# Patient Record
Sex: MALE | Race: WHITE
[De-identification: names, ages, dates, MRNs, and addresses within clinical notes are randomized per-mention and may not be internally consistent; named-entity substitution may affect disease eponyms.]

---

## 2014-10-08 VITALS
SYSTOLIC BLOOD PRESSURE: 130 MMHG | SYSTOLIC BLOOD PRESSURE: 130 MMHG | DIASTOLIC BLOOD PRESSURE: 78 MMHG | DIASTOLIC BLOOD PRESSURE: 78 MMHG | SYSTOLIC BLOOD PRESSURE: 130 MMHG | SYSTOLIC BLOOD PRESSURE: 130 MMHG | DIASTOLIC BLOOD PRESSURE: 78 MMHG | SYSTOLIC BLOOD PRESSURE: 130 MMHG | SYSTOLIC BLOOD PRESSURE: 130 MMHG | DIASTOLIC BLOOD PRESSURE: 78 MMHG | SYSTOLIC BLOOD PRESSURE: 130 MMHG | SYSTOLIC BLOOD PRESSURE: 130 MMHG | SYSTOLIC BLOOD PRESSURE: 130 MMHG | DIASTOLIC BLOOD PRESSURE: 78 MMHG | DIASTOLIC BLOOD PRESSURE: 78 MMHG | DIASTOLIC BLOOD PRESSURE: 78 MMHG | DIASTOLIC BLOOD PRESSURE: 78 MMHG | DIASTOLIC BLOOD PRESSURE: 78 MMHG

## 2021-05-12 ENCOUNTER — HOSPITAL ENCOUNTER (OUTPATIENT)
Dept: HOSPITAL 61 - PNCL | Age: 56
Discharge: HOME | End: 2021-05-12
Attending: ANESTHESIOLOGY
Payer: MEDICARE

## 2021-05-12 DIAGNOSIS — K21.9: ICD-10-CM

## 2021-05-12 DIAGNOSIS — M54.2: ICD-10-CM

## 2021-05-12 DIAGNOSIS — Z79.82: ICD-10-CM

## 2021-05-12 DIAGNOSIS — E11.9: ICD-10-CM

## 2021-05-12 DIAGNOSIS — M54.16: Primary | ICD-10-CM

## 2021-05-12 DIAGNOSIS — E78.00: ICD-10-CM

## 2021-05-12 DIAGNOSIS — I10: ICD-10-CM

## 2021-05-12 DIAGNOSIS — Z79.84: ICD-10-CM

## 2021-05-12 DIAGNOSIS — M96.1: ICD-10-CM

## 2021-05-12 DIAGNOSIS — Z98.890: ICD-10-CM

## 2021-05-12 DIAGNOSIS — Z87.891: ICD-10-CM

## 2021-05-12 DIAGNOSIS — M54.12: ICD-10-CM

## 2021-05-12 DIAGNOSIS — Z79.899: ICD-10-CM

## 2021-05-12 PROCEDURE — G0463 HOSPITAL OUTPT CLINIC VISIT: HCPCS

## 2021-05-12 NOTE — PDOC
Progress Note - Pain Clinic


Date of Service:


DOS:


DATE: 5/12/21 


TIME: 10:51





Diagnosis:


Dx:


Lumbar radiculopathy lumbar postlaminectomy syndrome


Cervical radiculopathy with cervicalgia


Fascial pain





History or Present Illness:


HPI:


55-year-old male returns for follow-up status post medication management with 

methadone and recent caudal epidural steroid injection.  Patient reports his leg

is doing much better after the caudal injection on the left side and his back is

still significantly painful ostial complaint is base the neck and upper 

extremity pain at this time.  Patient reports his pain is a 7 on scale 10 is 

worse with the past week for an average to its least is a 4 today patient 

reports no new motor or sensory deficits no side effects with his medication 

reports medication alone is about 70% improvement without any side effects as 

noted.  Patient reports he can increase his distance walking to greater activity

sleeping better as well with still wakes him about every 2-3 hours from the pain

in the low back and the neck.  Patient reports his leg is doing much better 

however the patient has no new motor or sensory deficits no new bowel or bladder

incontinence or other complaints.





Physical Exam:


VS:


Blood pressure is 147/73 pulse 65 respirations 18 temperature 98.6 F height is 

5 foot 9 inches weight is 209 pounds


PE:


PHYSICAL EXAMINATION:





GENERAL: The patient is awake, alert, oriented, appropriate, very pleasant 

demeanor


HEENT: Shows normocephalic, atraumatic.  Extraocular movements are intact and 

symmetrical.  Oral cavity: Mucous membranes moist and pink.  Dentition is 

intact.


NECK: Shows anterior throat supple without palpable lymphadenopathy noted.  

Swallow reflex symmetrical.


CHEST: Shows normal on inspection.  Breath sounds are clear bilaterally, no 

rales or rhonchi.


HEART: Shows S1, S2 clear.  No murmurs auscultated.


ABDOMEN: Soft, nontender, nondistended, obese.  No palpable organomegaly is 

noted.  No rebound or guarding demonstrated.


BACK: Shows spine grossly in the midline.  Normal-appearing cervical lordotic 

curvature.  Cervical paraspinous muscles show symmetrical with inspection on p

alpation some moderate tenderness diffusely in the middle and lower distribution

the paraspinous muscles bilaterally as well as into the superior medial 

trapezius without trigger points without radiation.  Patient has full rotation 

motion cervical spine both laterally with full extension full forward flexion 

without significant limitation or pain reported.  There is slightly increased 

thoracic kyphosis, some minor flattening of the lumbar lordotic curvature.  

Well-healed surgical scar is noted in the lumbar distribution.  Lumbar 

paraspinous muscles show symmetrical on inspection, on palpation shows some 

moderate tenderness diffusely throughout the upper, middle and lower 

distribution of the paraspinous muscles, but without specific trigger points, 

without radiation of pain.  The patient has good rotational motion of the lumbar

spine, both laterally as well as extension and flexion without significant 

difficulty.  No tenderness over the spinous processes, sacrum or sacroiliac 

regions.


EXTREMITIES: Lower extremities show deep tendon reflexes 1+ in the patellar and 

tendo calcaneus tendons.  Motor exam is 4 on a scale of 5 with right dors

iflexion, extension, quadriceps and hamstring flexion and 4/5 on the left.  

Peripheral pulses are 1+ posterior tibial.  No peripheral edema is noted 

bilaterally.  Lower extremities are warm and dry to touch, equal in color and 

appearance.  Upper extremity show deep tendon reflexes 2+ in the bicep and 

tricep tendons motor exam strong with  strength rated 5 out of 5 as is bicep

and tricep flexion.  Peripheral pulses are 2+ radial, no peripheral edema is 

noted bilaterally.


SKIN: Shows warm and dry, good turgor.  No edema.  No sores, rashes or bruising 

throughout.





Procedure:


Procedure:


Options discussed with patient.  Patient chart reviews his current medication 

regimen updated current review systems updated today as well.  We will refill 

patient's medication methadone for 2-month.  As he has had appropriate K tracks 

report as well as appropriate urinalyses to date.  Patient was given 

instructions well side effects beware with the medications.  We will also order 

MRI scan of the cervical spine to better differentiate cervical radicular 

symptoms.  Patient will follow up after MRI scan is completed.





Medication Injected:


Med Injected:


None





Condition at Discharge:


Condition at Discharge:


Condition at discharge is stable.











EMERITA HASSAN MD               May 12, 2021 10:54

## 2021-07-21 ENCOUNTER — HOSPITAL ENCOUNTER (OUTPATIENT)
Dept: HOSPITAL 61 - PNCL | Age: 56
Discharge: HOME | End: 2021-07-21
Attending: ANESTHESIOLOGY
Payer: MEDICARE

## 2021-07-21 DIAGNOSIS — Z79.82: ICD-10-CM

## 2021-07-21 DIAGNOSIS — Z87.891: ICD-10-CM

## 2021-07-21 DIAGNOSIS — I10: ICD-10-CM

## 2021-07-21 DIAGNOSIS — E11.9: ICD-10-CM

## 2021-07-21 DIAGNOSIS — M54.12: ICD-10-CM

## 2021-07-21 DIAGNOSIS — E78.00: ICD-10-CM

## 2021-07-21 DIAGNOSIS — M96.1: ICD-10-CM

## 2021-07-21 DIAGNOSIS — M19.90: ICD-10-CM

## 2021-07-21 DIAGNOSIS — Z79.899: ICD-10-CM

## 2021-07-21 DIAGNOSIS — Z79.84: ICD-10-CM

## 2021-07-21 DIAGNOSIS — M54.16: Primary | ICD-10-CM

## 2021-07-21 DIAGNOSIS — Z98.890: ICD-10-CM

## 2021-07-21 DIAGNOSIS — K21.9: ICD-10-CM

## 2021-07-21 DIAGNOSIS — M79.18: ICD-10-CM

## 2021-07-21 PROCEDURE — G0463 HOSPITAL OUTPT CLINIC VISIT: HCPCS

## 2021-07-21 PROCEDURE — 99212 OFFICE O/P EST SF 10 MIN: CPT

## 2021-07-21 NOTE — PDOC
Progress Note - Pain Clinic


Date of Service:


DOS:


DATE: 7/21/21 


TIME: 11:20





Diagnosis:


Dx:


Lumbar radiculopathy with lumbar postlaminectomy syndrome


Cervical radiculopathy with cervicalgia


Myofascial pain





History or Present Illness:


HPI:


55-year-old male returns for follow-up status post medication with methadone.  

Patient reports doing very well with this been a very stable regimen pain is 

fairly well controlled about 75 to 80% improvement overall with the medication 

without any side effects.  Patient reports he is doing well with still some pain

base of neck and shoulders upper back mid back low back and bilateral lower 

extremities.  We did do a caudal epidural steroid injection in March of this 

year which she did very well with and the pain began to return more signific

antly.  We did order an MRI scan however patient has been unable to get the 

scheduled MRI done and we will reschedule this as soon as possible.  Patient 

still reports pain low back bilateral lower extremity slightly worse on the left

than the right posterior gluteus posterior thigh posterior calf radiating pain 

patient scribes a stabbing radiating sharp can be severe at times as well 

patient rates as 8 on scale 10 is worse over the past week 6 on average 3 its 

least is a 6 today.  Patient reports again no side effects with his medication 

at this time and only increasing radicular pain in the bilateral lower 

extremities left greater than right.





Physical Exam:


VS:


Blood pressure 130/92 pulse 67 respirations 18 temperature 98.5 F weight is 207

pounds


PE:


PHYSICAL EXAMINATION:





GENERAL: The patient is awake, alert, oriented, appropriate, very pleasant in 

demeanor


HEENT: Shows normocephalic, atraumatic.  Extraocular movements are intact and 

symmetrical.


NECK: Shows anterior throat supple without palpable lymphadenopathy noted.


CHEST: Shows normal on inspection.  Breath sounds are clear bilaterally, no 

rales or rhonchi bilaterally.


HEART: Shows S1, S2 clear.  No murmurs auscultated.


ABDOMEN: Soft, nontender, nondistended, obese.  


BACK: Shows spine grossly in the midline.  Normal-appearing cervical lordotic 

curvature.  Cervical paraspinous muscles show symmetrical with inspection, on 

palpation some moderate tenderness diffusely with palpation of the inferior as

pect cervical paraspinous posture patient shows full rotation of motion cervical

spine with lateral as well as extension flexion without significant difficulty. 

There is slightly increased thoracic kyphosis, some minor flattening of the 

lumbar lordotic curvature.  Lumbar paraspinous muscles show symmetrical on 

inspection, on palpation shows some moderate tenderness diffusely throughout the

upper, middle and lower distribution of the paraspinous muscles, but without 

specific trigger points, without radiation of pain.  The patient has good 

rotational motion of the lumbar spine, both laterally as well as extension and 

flexion without significant difficulty. 


EXTREMITIES: Lower extremities show deep tendon reflexes 1+ in the patellar and 

tendo calcaneus tendons.  Motor exam is 4 on a scale of 5 with right 

dorsiflexion, extension, quadriceps and hamstring flexion and 4/5 on the left.  

Peripheral pulses are 1 posterior tibial.  No peripheral edema is noted 

bilaterally.  Lower extremities are warm and dry to touch, equal in color and 

appearance.  Upper extremity show deep tendon reflexes 2+ in the bicep triceps 

tendons, motor exam strong with  strength rated 5 out of 5 as is biceps and 

triceps flexion.


SKIN: Shows warm and dry, good turgor.  No edema.  No sores, rashes or bruising 

throughout.





Procedure:


Procedure:


Options were discussed with the patient.  Patient told chart was reviewed, his 

current medication regimen updated, and current review of systems updated today 

as well.  We will refill patient's methadone as patient had appropriate K 

transport as well as appropriate urinalyses to date.  Patient was given a 1 

month prescription electronically prescribed with instructions side effects 

aware of discussed with the medication.  Also, will have MRI scan lumbar spine 

rescheduled in preparation for potential repeat caudal epidural steroid 

injection.





Medication Injected:


Med Injected:


None





Condition at Discharge:


Condition at Discharge:


Condition at discharge is stable.











EMERITA HASSAN MD               Jul 21, 2021 11:24

## 2021-09-29 ENCOUNTER — HOSPITAL ENCOUNTER (OUTPATIENT)
Dept: HOSPITAL 61 - MRI | Age: 56
Discharge: HOME | End: 2021-09-29
Attending: ANESTHESIOLOGY
Payer: MEDICARE

## 2021-09-29 ENCOUNTER — HOSPITAL ENCOUNTER (OUTPATIENT)
Dept: HOSPITAL 61 - PNCL | Age: 56
Discharge: HOME | End: 2021-09-29
Attending: ANESTHESIOLOGY
Payer: MEDICARE

## 2021-09-29 DIAGNOSIS — Z87.891: ICD-10-CM

## 2021-09-29 DIAGNOSIS — K21.9: ICD-10-CM

## 2021-09-29 DIAGNOSIS — M51.16: ICD-10-CM

## 2021-09-29 DIAGNOSIS — E78.00: ICD-10-CM

## 2021-09-29 DIAGNOSIS — I10: ICD-10-CM

## 2021-09-29 DIAGNOSIS — Z98.890: ICD-10-CM

## 2021-09-29 DIAGNOSIS — Z79.82: ICD-10-CM

## 2021-09-29 DIAGNOSIS — M79.18: ICD-10-CM

## 2021-09-29 DIAGNOSIS — Z79.84: ICD-10-CM

## 2021-09-29 DIAGNOSIS — M47.22: Primary | ICD-10-CM

## 2021-09-29 DIAGNOSIS — Z79.899: ICD-10-CM

## 2021-09-29 DIAGNOSIS — M19.90: ICD-10-CM

## 2021-09-29 DIAGNOSIS — E11.9: ICD-10-CM

## 2021-09-29 DIAGNOSIS — M48.061: ICD-10-CM

## 2021-09-29 DIAGNOSIS — M54.12: ICD-10-CM

## 2021-09-29 DIAGNOSIS — M54.16: ICD-10-CM

## 2021-09-29 DIAGNOSIS — M48.02: ICD-10-CM

## 2021-09-29 DIAGNOSIS — M96.1: Primary | ICD-10-CM

## 2021-09-29 PROCEDURE — 99212 OFFICE O/P EST SF 10 MIN: CPT

## 2021-09-29 PROCEDURE — G0463 HOSPITAL OUTPT CLINIC VISIT: HCPCS

## 2021-09-29 PROCEDURE — 72148 MRI LUMBAR SPINE W/O DYE: CPT

## 2021-09-29 PROCEDURE — 72141 MRI NECK SPINE W/O DYE: CPT

## 2021-09-29 NOTE — RAD
MRI of the lumbar spine without contrast 9/29/2021



CLINICAL HISTORY: Lumbar radiculopathy.



TECHNIQUE: Unenhanced T1-weighted and T2-weighted sagittal and axial and inversion recovery sagittal 
images of the lumbar spine were obtained.



FINDINGS: Comparison is made to the patient's CT lumbar myelogram dated 10/8/2014.



Mild S-shaped curvature of the thoracolumbar spine is seen. The patient is post anterior fusion using
 what appear to be anterior plates, bone screws and bone graft material at L3-4, L4-5 and L5-S1. The 
patient is post posterior fusion across the facet joints with bone screws at L3-4, L4-5 and L5-S1. De
generative signal changes and loss of height are seen involving all the disks of the lumbar spine. De
generative signal changes are seen within the marrow surrounding these discs. The conus medullaris is
 normal morphology, position, and signal characteristics.



At the T12-L1 disc space there is a mild generalized disc bulge. Superimposed on this disc bulge is a
 central/left paracentral focal disc protrusion. This measures 4 mm in AP diameter. This does not margarita
ear to result in significant central spinal canal or neural foraminal stenosis.



The L1-2 disc space there is a moderate generalized disc bulge. Degenerative changes are seen involvi
ng the facet joints bilaterally. There is mild ligamentum flavum hypertrophy bilaterally. The disc bu
lge is eccentric to the left. These findings when combined result in mild to moderate left greater th
an right central spinal canal stenosis. Mild left neural foraminal stenosis is seen. The right neural
 foramen is patent.



At the L2-3 disc space there is a moderate generalized disc bulge. Degenerative changes are seen invo
lving the facet joints bilaterally. There is mild ligamentum flavum hypertrophy bilaterally. These fi
ndings when combined result in moderate to severe central spinal canal stenosis mild left greater lenora
n right neural foraminal stenosis is seen.



At the L3-4 disc space there is a mild to moderate generalized disc bulge. This is eccentric to the r
ight. Degenerative changes are seen involving the facet joints bilaterally. These findings when combi
bela result in severe central spinal canal stenosis. Mild bilateral neural foraminal stenosis is seen.




At the L4-5 disc space there is a mild to moderate generalized disc bulge. This is eccentric to the r
ight. Degenerative changes are seen involving the facet joints bilaterally. Findings result in mild t
o moderate central spinal canal stenosis. Mild right neural foraminal stenosis is seen. The left neur
al foramen is patent.



At the L5-S1 disc space there is a minimal generalized disc bulge. Degenerative changes are seen invo
lving the facet joints bilaterally. These findings do not result in significant central spinal canal 
or neural foraminal stenosis.



IMPRESSION:

1. Post anterior and posterior fusion at L3-4, L4-5 and L5-S1.

2. The changes of degenerative disc disease are seen throughout the lower thoracic and lumbar spine. 
These findings result in mild to moderate left greater than right central spinal canal stenosis at L1
-2, moderate to severe central spinal canal stenosis at L2-3, severe central spinal canal stenosis at
 L3-4 and mild to moderate central spinal canal stenosis at L4-5. Mild left neural foraminal stenosis
 is seen at L1-2. Mild left greater than right neural foraminal stenosis is seen at L2-3. Mild bilate
ral neural foraminal stenosis is seen at L3-4. Mild right neural foraminal stenosis is seen at L4-5.



Electronically signed by: Daniel Kaplan MD (9/29/2021 12:31 PM) [id number]

## 2021-09-29 NOTE — RAD
MRI of the cervical spine without contrast 9/29/2021



CLINICAL HISTORY: Cervical radiculopathy.



TECHNIQUE: Unenhanced T1-weighted, T2-weighted and inversion recovery sagittal and gradient echo and 
T2-weighted axial images of the cervical spine were obtained.



FINDINGS: Minimal lateral curvature of the cervical spine is seen convex to the left. There is slight
 reversal of the normal cervical lordosis. Degenerative signal changes are seen involving all of the 
disks of the cervical spine. Degenerative signal changes are seen within the marrow surrounding these
 discs. Loss of height of the C3-4, C4-5 and C6-7 discs is noted. No area of abnormal signal intensit
y is seen involving the cervical spinal cord. Patchy increased signal intensity is seen on the T2-ananda
ghted and inversion recovery images involving the visualized kenny which may reflect areas of ischemic
 demyelination.



At the C2-3 disc space there is a mild generalized disc bulge. Degenerative changes are seen involvin
g the uncovertebral and facet joints, right greater than left. These findings do not result in signif
icant central spinal canal stenosis. Mild right neural foraminal stenosis is seen. Left neural forame
n is patent.



At the C3-4 disc space there is a mild to moderate generalized disc bulge. This is eccentric to the r
ight. Degenerative changes are seen involving the uncovertebral and facet joints, right greater than 
left. These findings efface the anterior CSF without resulting in significant central spinal canal st
enosis. Severe right neural foraminal stenosis is seen. Mild left neural foraminal stenosis is noted.




At the C4-5 disc space there is a mild to moderate generalized disc bulge. Superimposed on this disc 
bulge is a central/left paracentral focal disc protrusion. This measures 3 mm in AP diameter. Degener
ative changes are seen involving the uncovertebral and facet joints, right greater than left. These f
indings do not result in significant central spinal canal stenosis. Moderate right neural foraminal s
tenosis is seen. The left neural foramen is patent.



At the C5-6 disc space there is a mild generalized disc bulge. Superimposed on this disc bulge is a l
eft paracentral focal disc protrusion. This measures 3 mm in AP diameter. Degenerative changes are se
en involving the uncovertebral and facet joints, right greater than left. These findings do not resul
t in significant central spinal canal stenosis. Moderate to severe right neural foraminal stenosis is
 seen. The left neural foramen is patent.



At the C6-7 disc space there is a mild generalized disc bulge. Degenerative changes are seen involvin
g the uncovertebral and facet joints, left greater than right. These findings when combined do not re
sult in significant central spinal canal stenosis. Mild to moderate left greater than right neural fo
raminal stenosis is seen.



At the C7-T1 disc space there is a mild to moderate generalized disc bulge. This is eccentric to the 
left. Degenerative changes are seen involving the facet joints bilaterally. These findings do not res
ult in significant central spinal canal stenosis. Mild to moderate left greater than right neural for
aminal stenosis is seen.



IMPRESSION: Degenerative changes are seen throughout the cervical spine. These findings do not result
 in significant central spinal canal stenosis at any level. Multilevel neural foraminal stenosis of v
arying severity is seen as discussed above.



Electronically signed by: Daniel Kaplan MD (9/29/2021 12:20 PM) [id number]

## 2021-09-29 NOTE — PDOC
Progress Note - Pain Clinic


Date of Service:


DOS:


DATE: 9/29/21 


TIME: 12:12





Diagnosis:


Dx:


Lumbar radiculopathy with lumbar postlaminectomy syndrome


Cervical radiculopathy with cervicalgia


Myofascial pain





History or Present Illness:


HPI:


56-year-old male returns for follow-up status post caudal epidural steroid 

injection and medication management.  Patient reports he did very well with each

we started MRI scan he had that done this morning of the cervical and lumbar 

spines results are pending at time of this dictation.  Patient reports he is 

doing very well with the methadone without any side effects and with overall 

reduction in pain by about 75 to 80%.  Patient still complains of pain base the 

neck and shoulders upper back mid back low back and bilateral lower extremity 

significantly and has a new pain finding of left-sided back pain which is always

been more on the right side he reports he felt a "pop" about 2 weeks ago when he

was bending and this had some significant pain on the left side since that time 

it is resolved to mild extent but still present and still more painful on the 

left where it generally is more painful on the right patient reports now sharp 

and stabbing radiating can be severe rated as 8 on scale 10 is worse over the 

past week 5 on average 3 its least is a 5 today.  Patient reports no new motor 

or sensory deficits no bowel or bladder incontinence.





Physical Exam:


VS:


Blood pressure is 140/86 pulse 103 respirations 16 temperature 98.4 F weight is

205 pounds


PE:


PHYSICAL EXAMINATION:





GENERAL: The patient is awake, alert, oriented, appropriate, very pleasant in 

demeanor


HEENT: Shows normocephalic, atraumatic.  Extraocular movements are intact and 

symmetrical.  Oral cavity: Mucous membranes moist and pink.  Dentition is 

intact.


NECK: Shows anterior throat supple without palpable lymphadenopathy noted.  

Swallow reflex symmetrical.


CHEST: Shows normal on inspection.  Breath sounds are clear bilaterally, distant

but no rales or rhonchi.


HEART: Shows S1, S2 clear.  No murmurs auscultated.


ABDOMEN: Soft, nontender, nondistended, obese.  No palpable organomegaly is 

noted.


BACK: Shows spine grossly in the midline.  Normal-appearing cervical lordotic 

curvature.  Cervical paraspinous muscles show symmetrical with inspection, on 

palpation some moderate tenderness diffusely in the middle and inferior aspect 

of the cervical paraspinous musculature bilaterally but without trigger points 

without significant radiation without atrophy or hypertrophy.  There is slightly

increased thoracic kyphosis, some flattening of the lumbar lordotic curvature, 

with well-healed surgical scarring.  Lumbar paraspinous muscles show symmetrical

on inspection, on palpation shows some moderate tenderness diffusely throughout 

the upper, middle and lower distribution of the paraspinous muscles, but without

specific trigger points, without radiation of pain.  The patient has good 

rotational motion of the lumbar spine, both laterally as well as extension and 

flexion without significant difficulty.  No tenderness over the spinous 

processes, sacrum or sacroiliac regions.


EXTREMITIES: Lower extremities show deep tendon reflexes 1+ in the patellar and 

tendo calcaneus tendons.  Motor exam is 4 on a scale of 5 with right 

dorsiflexion, extension, quadriceps and hamstring flexion and 4/5 on the left.  

Peripheral pulses are 1+ posterior tibial.  No peripheral edema is noted 

bilaterally.  Lower extremities are warm and dry to touch, equal in color and 

appearance.  Upper extremity show deep tendon reflexes 2+ in the bicep tricep 

tendons, motor exam is strong with 5 out of 5  strength bicep and tricep 

flexion and symmetrical peripheral pulses are 2+ radial.


SKIN: Shows warm and dry, good turgor.  No edema.  No sores, rashes or bruising 

throughout.





Procedure:


Procedure:


Options were discussed with the patient.  Patient chart reviews his current 

medication regimen updated current review of systems updated today as well.  We 

will refill patient's methadone, with instructions and side effects were 

discussed with the medication.  Patient has had appropriate K tracks reporting 

as well as appropriate urinalyses to date.  We will have patient return to 

clinic in approximate 4 weeks as scheduled.


MRI scans performed this morning pending results we will discuss these with the 

patient as it is they are available.





Medication Injected:


Med Injected:


None





Condition at Discharge:


Condition at Discharge:


Condition at discharge is stable.











EMERITA HASSAN MD               Sep 29, 2021 12:17

## 2021-11-09 ENCOUNTER — HOSPITAL ENCOUNTER (OUTPATIENT)
Dept: HOSPITAL 61 - PNCL | Age: 56
Discharge: HOME | End: 2021-11-09
Attending: ANESTHESIOLOGY
Payer: MEDICARE

## 2021-11-09 DIAGNOSIS — M54.16: Primary | ICD-10-CM

## 2021-11-09 DIAGNOSIS — Z79.82: ICD-10-CM

## 2021-11-09 DIAGNOSIS — E78.00: ICD-10-CM

## 2021-11-09 DIAGNOSIS — E11.9: ICD-10-CM

## 2021-11-09 DIAGNOSIS — Z87.891: ICD-10-CM

## 2021-11-09 DIAGNOSIS — Z79.899: ICD-10-CM

## 2021-11-09 DIAGNOSIS — Z98.890: ICD-10-CM

## 2021-11-09 DIAGNOSIS — K21.9: ICD-10-CM

## 2021-11-09 DIAGNOSIS — M79.18: ICD-10-CM

## 2021-11-09 DIAGNOSIS — M96.1: ICD-10-CM

## 2021-11-09 DIAGNOSIS — M54.12: ICD-10-CM

## 2021-11-09 DIAGNOSIS — I10: ICD-10-CM

## 2021-11-09 PROCEDURE — G0463 HOSPITAL OUTPT CLINIC VISIT: HCPCS

## 2021-11-09 PROCEDURE — 99212 OFFICE O/P EST SF 10 MIN: CPT

## 2021-11-09 NOTE — PDOC
Progress Note - Pain Clinic


Date of Service:


DOS:


DATE: 11/9/21 


TIME: 15:56





Diagnosis:


Dx:


Lumbar radiculopathy with lumbar postlaminectomy syndrome


Cervical radiculopathy with cervicalgia


Myofascial pain





History or Present Illness:


HPI:


Telemedicine visit via telephone today with patient with identity verified with 

date of birth as well as full name, total time spent 11 minutes


56-year-old male with request for refill medication methadone 40 mg 3 times 

daily.  Patient reports he is still doing very well on this medication regimen 

as has no significant side effects and that controls his pain to about a 70% 

level most days.  Patient reports with careful watch of his certain activities 

extended standing walking etc. he can control the pain fairly well with the 

medication regimen at this time.  Patient reports again no side effects has had 

appropriate K tracks portables appropriate urinalyses to date.  Patient does 

have MRI results pending, and we will discuss those once they are available and 

we did discuss potential interval vaginal techniques as well and patient is 

still interested in pursuing this as he is complaining of more pain in the low 

back radiating to the bilateral lower extremities and radicular fashion.  

Patient was given instructions well side effects aware with the medications and 

will follow up in approximately 4 weeks as scheduled.





Physical Exam:


PE:














EMERITA HASSAN MD                Nov 9, 2021 15:58

## 2021-12-08 ENCOUNTER — HOSPITAL ENCOUNTER (OUTPATIENT)
Dept: HOSPITAL 61 - PNCL | Age: 56
Discharge: HOME | End: 2021-12-08
Attending: ANESTHESIOLOGY
Payer: MEDICARE

## 2021-12-08 DIAGNOSIS — Z79.899: ICD-10-CM

## 2021-12-08 DIAGNOSIS — E78.00: ICD-10-CM

## 2021-12-08 DIAGNOSIS — K21.9: ICD-10-CM

## 2021-12-08 DIAGNOSIS — E11.9: ICD-10-CM

## 2021-12-08 DIAGNOSIS — I10: ICD-10-CM

## 2021-12-08 DIAGNOSIS — Z79.84: ICD-10-CM

## 2021-12-08 DIAGNOSIS — Z79.82: ICD-10-CM

## 2021-12-08 DIAGNOSIS — M96.1: ICD-10-CM

## 2021-12-08 DIAGNOSIS — Z87.891: ICD-10-CM

## 2021-12-08 DIAGNOSIS — Z98.890: ICD-10-CM

## 2021-12-08 DIAGNOSIS — M79.18: ICD-10-CM

## 2021-12-08 DIAGNOSIS — M54.16: Primary | ICD-10-CM

## 2021-12-08 DIAGNOSIS — M54.12: ICD-10-CM

## 2021-12-08 PROCEDURE — 62323 NJX INTERLAMINAR LMBR/SAC: CPT

## 2021-12-08 NOTE — PDOC
Progress Note - Pain Clinic


Date of Service:


DOS:


DATE: 12/8/21 


TIME: 13:46





Diagnosis:


Dx:


Lumbar radiculopathy with lumbar postlaminectomy syndrome


Myofascial pain


Cervical radiculopathy with cervicalgia





History or Present Illness:


HPI:


56-year-old male returns for follow-up status post medication management with 

methadone.  Patient with significant pain low back into the left greater than 

right lower extremities mostly posterior gluteus posterior thigh posterior 

calves also in the lateral thigh anterior thigh on the left patient reports 

getting worse with time coming more constant severe unbearable in the left leg 

greater than right as well as across the low back patient reports that the 

methadone does fairly well with about an 80% improvement the pain but lately the

pain is getting much worse with the radicular quality in the left lower 

extremity greater than the right patient reports no loss of motor function with 

significant fatigability lower extremities with walking and standing waist 

trying to do stretching and exercises it is becoming more difficult because of 

the pain.  Patient rates it as a 9 on scale 10 is worse over the past week 5 on 

average for its least is a 7 today.  Patient reports no loss of motor function 

no bowel or bladder incontinence.





Physical Exam:


VS:


Blood pressure is 134/85 pulse 88 respirations 18 temperature 98.7 F weight is 

209 pounds


PE:


PHYSICAL EXAMINATION:





GENERAL: The patient is awake, alert, oriented, appropriate, very pleasant in d

emeanor


HEENT: Shows normocephalic, atraumatic.  Extraocular movements are intact and 

symmetrical.  Oral cavity: Mucous membranes moist and pink.  Dentition is 

intact.


NECK: Shows anterior throat supple without palpable lymphadenopathy noted.  

Swallow reflex symmetrical.


CHEST: Shows normal on inspection.  Breath sounds are clear bilaterally, distant

no rales or rhonchi.


HEART: Shows S1, S2 clear.  No murmurs auscultated.


ABDOMEN: Soft, nontender, nondistended, obese.  No palpable organomegaly is 

noted.


BACK: Shows spine grossly in the midline.  Normal-appearing cervical lordotic 

curvature.  There is increased thoracic kyphosis, flattening of the lumbar 

lordotic curvature with well-healed surgical scarring noted.  Lumbar paraspinous

muscles show symmetrical on inspection, on palpation shows some moderate 

tenderness diffusely throughout the upper, middle and lower distribution of the 

paraspinous muscles, but without specific trigger points, without radiation of 

pain.  The patient has good rotational motion of the lumbar spine, both 

laterally as well as extension and flexion without significant difficulty.  No 

tenderness over the spinous processes, sacrum or sacroiliac regions.


EXTREMITIES: Lower extremities show deep tendon reflexes 1+ in the patellar and 

tendo calcaneus tendons.  Motor exam is 4 on a scale of 5 with right 

dorsiflexion, extension, quadriceps and hamstring flexion and 4/5 on the left.  

Peripheral pulses are 1+ posterior tibial.  No peripheral edema is noted 

bilaterally.  Lower extremities are warm and dry to touch, equal in color and 

appearance.


SKIN: Shows warm and dry, good turgor.  No edema.  No sores, rashes or bruising 

throughout.





Procedure:


Procedure:


Options were discussed with the patient.  Patient chart reviews his current 

medication regimen updated current review of systems updated today as well.  We 

will proceed with a caudal approach epidural steroid injection stable 

fluoroscopic guidance risks were discussed including but not limited to: 

Bleeding, infection, possibility of epidural hematoma and subsequent 

neurological compromise, dural puncture, headaches, spinal cord and/or nerve 

damage, side effects of steroid medication, and poor results regarding pain 

control.  Patient understands and wished to proceed.  Patient will return to the

clinic in approximate 4 weeks for follow-up, was counseled as return 

appointment, activity level, and side effects to be aware of.  Patient to 

follow-up for methadone refill as scheduled





Medication Injected:


Med Injected:


  Procedure is lumbar epidural steroid injection under local anesthetic using 

sterile prep and drape at the caudal level using C-arm fluoroscopic guidance in 

both AP and lateral views medications injected is 120 mg Depo-Medrol +10mL 

preservative-free normal saline and 2 mL contrast- condition at discharge is 

stable patient tolerated procedure well had no complications.





Condition at Discharge:


Condition at Discharge:


Condition at discharge stable, patient tolerated the procedure well and had no 

complications.











EMERITA HASSAN MD                Dec 8, 2021 13:51

## 2022-01-21 ENCOUNTER — HOSPITAL ENCOUNTER (OUTPATIENT)
Dept: HOSPITAL 61 - PNCL | Age: 57
Discharge: HOME | End: 2022-01-21
Attending: ANESTHESIOLOGY
Payer: MEDICARE

## 2022-01-21 DIAGNOSIS — M79.18: ICD-10-CM

## 2022-01-21 DIAGNOSIS — Z79.82: ICD-10-CM

## 2022-01-21 DIAGNOSIS — K21.9: ICD-10-CM

## 2022-01-21 DIAGNOSIS — Z79.899: ICD-10-CM

## 2022-01-21 DIAGNOSIS — Z98.890: ICD-10-CM

## 2022-01-21 DIAGNOSIS — E11.9: ICD-10-CM

## 2022-01-21 DIAGNOSIS — E78.00: ICD-10-CM

## 2022-01-21 DIAGNOSIS — M54.12: ICD-10-CM

## 2022-01-21 DIAGNOSIS — M54.16: Primary | ICD-10-CM

## 2022-01-21 DIAGNOSIS — I10: ICD-10-CM

## 2022-01-21 DIAGNOSIS — Z87.891: ICD-10-CM

## 2022-01-21 DIAGNOSIS — M96.1: ICD-10-CM

## 2022-01-21 PROCEDURE — G0463 HOSPITAL OUTPT CLINIC VISIT: HCPCS

## 2022-01-21 PROCEDURE — 99212 OFFICE O/P EST SF 10 MIN: CPT

## 2022-01-21 NOTE — PDOC
Progress Note - Pain Clinic


Date of Service:


DOS:


DATE: 1/21/22 


TIME: 12:43





Diagnosis:


Dx:


Lumbar radiculopathy with lumbar postlaminectomy syndrome


Cervical radiculopathy


Myofascial pain





History or Present Illness:


HPI:


Telemedicine visit today with patient's identity verified with date of birth as 

well as full name, total time spent 14 minutes


56-year-old male via telemedicine visit today for refill of medication 

methadone.  Patient reports doing better after his last visit when he underwent 

a caudal epidural steroid injection with about a 50% improvement in the pain but

is beginning to return patient reports still chronic pain in the neck and s

houlders as well as the low back and lower extremities but significantly 

improved after procedure and doing well with methadone has been on very stable 

regimen with the medication as well without any significant side effects.  

Patient has had appropriate K tracks report as well as appropriate urinalyses to

date and we discussed his most recent urinalyses with some increased glucose in 

the urine as well and patient is on a diabetic medication which will cause this 

as well.  We will electronically prescribe patient's methadone with instructions

and side effects aware discussed with the patient he will follow-up in 

approximate 30 days as scheduled.





Physical Exam:


PE:














EMERITA HASSAN MD               Jan 21, 2022 12:46

## 2022-02-16 ENCOUNTER — HOSPITAL ENCOUNTER (OUTPATIENT)
Dept: HOSPITAL 61 - PNCL | Age: 57
Discharge: HOME | End: 2022-02-16
Attending: ANESTHESIOLOGY
Payer: MEDICARE

## 2022-02-16 DIAGNOSIS — M96.1: ICD-10-CM

## 2022-02-16 DIAGNOSIS — K21.9: ICD-10-CM

## 2022-02-16 DIAGNOSIS — M79.18: ICD-10-CM

## 2022-02-16 DIAGNOSIS — E78.00: ICD-10-CM

## 2022-02-16 DIAGNOSIS — E11.9: ICD-10-CM

## 2022-02-16 DIAGNOSIS — M19.90: ICD-10-CM

## 2022-02-16 DIAGNOSIS — M54.12: ICD-10-CM

## 2022-02-16 DIAGNOSIS — Z79.82: ICD-10-CM

## 2022-02-16 DIAGNOSIS — M54.16: Primary | ICD-10-CM

## 2022-02-16 DIAGNOSIS — Z98.890: ICD-10-CM

## 2022-02-16 DIAGNOSIS — Z87.891: ICD-10-CM

## 2022-02-16 DIAGNOSIS — Z79.84: ICD-10-CM

## 2022-02-16 DIAGNOSIS — I10: ICD-10-CM

## 2022-02-16 DIAGNOSIS — Z79.899: ICD-10-CM

## 2022-02-16 PROCEDURE — G0463 HOSPITAL OUTPT CLINIC VISIT: HCPCS

## 2022-02-16 PROCEDURE — 99212 OFFICE O/P EST SF 10 MIN: CPT

## 2022-02-16 NOTE — PDOC
Progress Note - Pain Clinic


Date of Service:


DOS:


DATE: 2/16/22 


TIME: 13:38





Diagnosis:


Dx:


Lumbar radiculopathy with lumbar postlaminectomy syndrome


Cervical radiculopathy with cervicalgia


Myofascial pain





History or Present Illness:


HPI:


56-year-old male returns for follow-up status post caudal epidural steroid 

injection x1.  Patient reports about 75% improvement although it was increased 

pain in the first few days and the pain began to decrease and has been doing 

much better with the lower extremities patient reports still pain in the left 

lower extremity greater than right also pain in the shoulders and neck and upper

extremities with some tingling in the left hand patient reports is becoming more

noticeable over the past week or so has been more active taking care of his 

grandson.  Patient reports that his low back and legs have become much better is

increase his activity distance walking doing household activities greater ease 

and comfort taking care of his grandson with greater ease and travel with 

greater ease as well patient reports he is still bothering at night from sleep 

about every 4 hours and is mainly in the neck and left upper extremity at this 

time patient reports is an 8 on scale 10 is worse over the past week on average 

5 and a 3 at its least is a 3 today.  Patient reports no side effects with his 

medication has had appropriate K tracks report as well as appropriate urinalyses

as well to date.  Patient reports no bowel or bladder incontinence.





Physical Exam:


VS:


Blood pressure is 133 189 pulse 67 respirations 18 temperature 98.6 F height is

5 foot 9 inches weight is 209 pounds.


PE:


PHYSICAL EXAMINATION:





GENERAL: The patient is awake, alert, oriented, appropriate, very pleasant in 

demeanor


HEENT: Shows normocephalic, atraumatic.  Extraocular movements are intact and 

symmetrical.  Oral cavity: Mucous membranes moist and pink.  Dentition is 

intact.


NECK: Shows anterior throat supple without palpable lymphadenopathy noted.  

Swallow reflex symmetrical.


CHEST: Shows normal on inspection.  Breath sounds are clear bilaterally, distant

but no rales or rhonchi.


HEART: Shows S1, S2 clear.  No murmurs auscultated.


ABDOMEN: Soft, nontender, nondistended.  No palpable organomegaly is noted. 


BACK: Shows spine grossly in the midline.  Normal-appearing cervical lordotic 

curvature.  Cervical paraspinous muscles show symmetrical inspection, on palp

ation some moderate tenderness diffusely in the inferior aspect the cervical 

paraspinous muscular 1 left than the right in the superior medial trapezius but 

without radiation.  Patient shows good rotation motion cervical spine both 

laterally as well as extension flexion.  There is mildly increased thoracic 

kyphosis, some flattening of the lumbar lordotic curvature, with well-healed 

surgical scaring again noted.  Lumbar paraspinous muscles show symmetrical on 

inspection, on palpation shows some moderate tenderness diffusely throughout the

upper, middle and lower distribution of the paraspinous muscles, but without 

specific trigger points, without radiation of pain.  The patient has good 

rotational motion of the lumbar spine, both laterally as well as extension and 

flexion without significant difficulty.


EXTREMITIES: Lower extremities show deep tendon reflexes 1+ in the patellar and 

tendo calcaneus tendons.  Motor exam is 4 on a scale of 5 with right 

dorsiflexion, extension, quadriceps and hamstring flexion and 4/5 on the left.  

Peripheral pulses are 1+ posterior tibial.  No peripheral edema is noted 

bilaterally.  Lower extremities are warm and dry to touch, equal in color and 

appearance.  Upper extremity show deep tendon flexes 2+ in the bicep tricep 

tendons, motor exam is strong with  strength rated at 4 to scale 5 on the 

left and 5 out of 5 on the right bicep tricep flexion is 5 out of 5 bilaterally.

 Shoulder shrug is strong and intact without loss of strength on resistance 

bilaterally.


SKIN: Shows warm and dry, good turgor.  No edema.  No sores, rashes or bruising 

throughout.





Procedure:


Procedure:


Options discussed with the patient.  Patient chart was reviewed his current 

medication regimen updated current review of systems updated today as well.  We 

will refill patient's methadone as patient is been on very stable regimen and 

has had appropriate K tracks report as well as appropriate urinalyses to date.  

Patient is given instructions well side effects aware of with the medication.  

This will be electronically prescribed for 30-day time.  With regard to 

patient's lower extremities doing much better after 6 caudal epidural steroid 

injection will hold on further injections at this time.  Patient will follow up 

in approximate 30 days as scheduled.





Medication Injected:


Med Injected:


None





Condition at Discharge:


Condition at Discharge:


Condition at discharge is stable.











EMERITA HASSAN MD               Feb 16, 2022 13:43

## 2022-05-04 ENCOUNTER — HOSPITAL ENCOUNTER (OUTPATIENT)
Dept: HOSPITAL 61 - PNCL | Age: 57
Discharge: HOME | End: 2022-05-04
Attending: ANESTHESIOLOGY
Payer: MEDICARE

## 2022-05-04 DIAGNOSIS — M19.90: ICD-10-CM

## 2022-05-04 DIAGNOSIS — E78.00: ICD-10-CM

## 2022-05-04 DIAGNOSIS — M79.18: ICD-10-CM

## 2022-05-04 DIAGNOSIS — Z79.84: ICD-10-CM

## 2022-05-04 DIAGNOSIS — E11.9: ICD-10-CM

## 2022-05-04 DIAGNOSIS — Z98.890: ICD-10-CM

## 2022-05-04 DIAGNOSIS — M54.12: ICD-10-CM

## 2022-05-04 DIAGNOSIS — Z79.82: ICD-10-CM

## 2022-05-04 DIAGNOSIS — Z79.899: ICD-10-CM

## 2022-05-04 DIAGNOSIS — K21.9: ICD-10-CM

## 2022-05-04 DIAGNOSIS — M96.1: ICD-10-CM

## 2022-05-04 DIAGNOSIS — Z87.891: ICD-10-CM

## 2022-05-04 DIAGNOSIS — I10: ICD-10-CM

## 2022-05-04 DIAGNOSIS — M54.16: Primary | ICD-10-CM

## 2022-05-04 PROCEDURE — G0463 HOSPITAL OUTPT CLINIC VISIT: HCPCS

## 2022-05-04 PROCEDURE — 99212 OFFICE O/P EST SF 10 MIN: CPT

## 2022-05-04 NOTE — PDOC
Progress Note - Pain Clinic


Date of Service:


DOS:


DATE: 5/4/22 


TIME: 13:56





Diagnosis:


Dx:


Lumbar radiculopathy lumbar postlaminectomy syndrome


Cervical radiculopathy


Myofascial pain





History or Present Illness:


HPI:


56-year-old male returns for follow-up status post medication management as well

as caudal epidural steroid injection patient taking methadone 10 mg 4 tablets 3 

times daily.  Patient reports doing very well since and has been on very stable 

regimen with the medication without any specific side effects patient reports 

still significant pain in the low back and especially in the right lower 

extremity which has been bothering him over the past week or so he says he 

reports he heard a "pop" in his right side of the low back with some pain rating

to his right lower extremity is now just a tingling numbness but is there with 

walking and standing patient reports is a 9 on scale 10 is worse over the past 

week 5 on average 3 to [place] is a 5 today.  Patient reports aching sharp and 

dull tight in the back alternating radiating cramping and stabbing in the right 

lower extremity patient reports it can be severe and unbearable with extended 

standing walking but the medication generally decreases the pain by about 70 to 

75%.  Patient reports no new motor or sensory deficits no bowel or bladder 

incontinence.





Physical Exam:


VS:


Blood pressure is 131/80 pulse 80 respirations 18 temperature 99.1 F weight is 

209 pounds height is 5 feet 9 inches


PE:


PHYSICAL EXAMINATION:





GENERAL: The patient is awake, alert, oriented, appropriate, very pleasant in 

demeanor


HEENT: Shows normocephalic, atraumatic.  Extraocular movements are intact and 

symmetrical.  Oral cavity: Mucous membranes moist and pink.  Dentition is 

intact.


NECK: Shows anterior throat supple without palpable lymphadenopathy noted.  

Swallow reflex symmetrical.


CHEST: Shows normal on inspection.  Breath sounds are clear bilaterally, no 

rales or rhonchi.


HEART: Shows S1, S2 clear.  No murmurs auscultated.


ABDOMEN: Soft, nontender, nondistended.  No palpable organomegaly is noted.  


BACK: Shows spine grossly in the midline.  Normal-appearing cervical lordotic 

curvature.  There is slightly increased thoracic kyphosis, some flattening of 

the lumbar lordotic curvature with well-healed surgical scarring noted.  Lumbar 

paraspinous muscles show symmetrical on inspection, on palpation shows some 

moderate tenderness diffusely throughout the upper, middle and lower 

distribution of the paraspinous muscles without specific trigger points, without

radiation of pain.  The patient has good rotational motion of the lumbar spine, 

both laterally as well as extension and flexion without significant difficulty. 


EXTREMITIES: Lower extremities show deep tendon reflexes 1+ in the patellar and 

tendo calcaneus tendons.  Motor exam is 4 on a scale of 5 with right 

dorsiflexion, extension, quadriceps and hamstring flexion and 4/5 on the left.  

Peripheral pulses are 1+ posterior tibial.  No peripheral edema is noted 

bilaterally.  Lower extremities are warm and dry to touch, equal in color and 

appearance.


SKIN: Shows warm and dry, good turgor.  No edema.  No sores, rashes or bruising 

throughout.





Procedure:


Procedure:


Options discussed with the patient.  Patient's old chart was reviewed his 

current medication regimen updated current review of systems updated today as 

well.  We will refill patient's methadone as he has had appropriate K tracks 

report as well as appropriate urinalyses to date.  Patient was given 

instructions well side effects beware with the medications and we will also 

refill Celebrex 200 mg twice daily, and will start new medication of Lyrica 150 

mg twice daily.  Patient will follow up in approximately 4 weeks as scheduled.





Medication Injected:


Med Injected:


None





Condition at Discharge:


Condition at Discharge:


Condition at discharge is stable.











EMERITA HASSAN MD                May 4, 2022 13:59